# Patient Record
Sex: MALE | Race: WHITE
[De-identification: names, ages, dates, MRNs, and addresses within clinical notes are randomized per-mention and may not be internally consistent; named-entity substitution may affect disease eponyms.]

---

## 2020-08-10 ENCOUNTER — HOSPITAL ENCOUNTER (EMERGENCY)
Dept: HOSPITAL 8 - ED | Age: 20
Discharge: HOME | End: 2020-08-10
Payer: COMMERCIAL

## 2020-08-10 VITALS — WEIGHT: 141.1 LBS | BODY MASS INDEX: 23.51 KG/M2 | HEIGHT: 65 IN

## 2020-08-10 VITALS — SYSTOLIC BLOOD PRESSURE: 115 MMHG | DIASTOLIC BLOOD PRESSURE: 77 MMHG

## 2020-08-10 DIAGNOSIS — R00.2: ICD-10-CM

## 2020-08-10 DIAGNOSIS — F41.1: ICD-10-CM

## 2020-08-10 DIAGNOSIS — R07.89: Primary | ICD-10-CM

## 2020-08-10 DIAGNOSIS — R00.0: ICD-10-CM

## 2020-08-10 DIAGNOSIS — F17.210: ICD-10-CM

## 2020-08-10 PROCEDURE — 99283 EMERGENCY DEPT VISIT LOW MDM: CPT

## 2020-08-10 PROCEDURE — 71045 X-RAY EXAM CHEST 1 VIEW: CPT

## 2020-08-10 PROCEDURE — 99406 BEHAV CHNG SMOKING 3-10 MIN: CPT

## 2020-08-10 PROCEDURE — 93005 ELECTROCARDIOGRAM TRACING: CPT

## 2020-08-10 NOTE — NUR
Patient presents to ER c/o sharp sternal CP which started approx 1 hr ago; pain 
does not radiate. Patient also c/o mild SOB. Denies nausea. Patient states he 
has had CP before and it was a panic attack but this feels different than 
before. 

Patient is in NAD. Respirations even and unlabored.